# Patient Record
Sex: FEMALE | Race: WHITE | NOT HISPANIC OR LATINO | Employment: FULL TIME | URBAN - METROPOLITAN AREA
[De-identification: names, ages, dates, MRNs, and addresses within clinical notes are randomized per-mention and may not be internally consistent; named-entity substitution may affect disease eponyms.]

---

## 2017-03-08 ENCOUNTER — CONVERSION ENCOUNTER (OUTPATIENT)
Dept: MAMMOGRAPHY | Facility: HOSPITAL | Age: 47
End: 2017-03-08

## 2017-03-10 ENCOUNTER — CONVERSION ENCOUNTER (OUTPATIENT)
Dept: ULTRASOUND IMAGING | Facility: HOSPITAL | Age: 47
End: 2017-03-10

## 2020-04-30 ENCOUNTER — HOSPITAL ENCOUNTER (OUTPATIENT)
Dept: OTHER | Facility: HOSPITAL | Age: 50
Discharge: HOME | End: 2020-04-30

## 2020-05-02 LAB
CONV MUMPS ANTIBODY IGG: 207 AU/ML
HBV SURFACE AB SER QL: NON REACTIVE
MEV IGG SER IA-ACNC: <13.5 AU/ML
RUBV IGG SER-ACNC: 118.9 [IU]/ML
VZV IGG SER IA-ACNC: >4000 INDEX

## 2020-12-30 ENCOUNTER — HOSPITAL ENCOUNTER (OUTPATIENT)
Dept: OTHER | Facility: HOSPITAL | Age: 50
Discharge: HOME | End: 2020-12-30
Attending: INTERNAL MEDICINE

## 2021-01-28 ENCOUNTER — HOSPITAL ENCOUNTER (OUTPATIENT)
Dept: OTHER | Facility: HOSPITAL | Age: 51
Discharge: HOME | End: 2021-01-28
Attending: INTERNAL MEDICINE

## 2022-11-09 ENCOUNTER — TRANSCRIBE ORDERS (OUTPATIENT)
Dept: ADMINISTRATIVE | Facility: HOSPITAL | Age: 52
End: 2022-11-09

## 2022-11-09 DIAGNOSIS — Z12.31 VISIT FOR SCREENING MAMMOGRAM: Primary | ICD-10-CM

## 2025-03-12 ENCOUNTER — TELEPHONE (OUTPATIENT)
Facility: HOSPITAL | Age: 55
End: 2025-03-12
Payer: COMMERCIAL

## 2025-03-12 NOTE — TELEPHONE ENCOUNTER
Spoke with patient regarding a referral we received from Letty Garza for Hep C. Patient has scheduled an appointment on 4/4/25@11:00. Ok to scheduled per Ankita.

## 2025-03-12 NOTE — TELEPHONE ENCOUNTER
Patients provider called concerned that patient has not been contacted yet by Gastro, she said referral was sent over a week ago and it is urgent. Please have patient seen as soon as possible.

## 2025-04-04 ENCOUNTER — SPECIALTY PHARMACY (OUTPATIENT)
Dept: OTHER | Facility: HOSPITAL | Age: 55
End: 2025-04-04
Payer: COMMERCIAL

## 2025-04-04 ENCOUNTER — LAB (OUTPATIENT)
Facility: HOSPITAL | Age: 55
End: 2025-04-04
Payer: COMMERCIAL

## 2025-04-04 ENCOUNTER — OFFICE VISIT (OUTPATIENT)
Dept: GASTROENTEROLOGY | Facility: HOSPITAL | Age: 55
End: 2025-04-04
Payer: COMMERCIAL

## 2025-04-04 VITALS
DIASTOLIC BLOOD PRESSURE: 78 MMHG | WEIGHT: 223.6 LBS | SYSTOLIC BLOOD PRESSURE: 116 MMHG | HEIGHT: 65 IN | BODY MASS INDEX: 37.25 KG/M2 | HEART RATE: 86 BPM

## 2025-04-04 DIAGNOSIS — B18.2 CHRONIC HEPATITIS C WITHOUT HEPATIC COMA: Primary | ICD-10-CM

## 2025-04-04 DIAGNOSIS — R74.8 ELEVATED LIVER ENZYMES: ICD-10-CM

## 2025-04-04 PROBLEM — I10 HYPERTENSION: Status: ACTIVE | Noted: 2025-04-04

## 2025-04-04 PROBLEM — F41.9 ANXIETY: Status: ACTIVE | Noted: 2025-04-04

## 2025-04-04 LAB
ALBUMIN SERPL-MCNC: 4.4 G/DL (ref 3.5–5.2)
ALBUMIN/GLOB SERPL: 1.2 G/DL
ALP SERPL-CCNC: 76 U/L (ref 39–117)
ALT SERPL W P-5'-P-CCNC: 296 U/L (ref 1–33)
ANION GAP SERPL CALCULATED.3IONS-SCNC: 13.6 MMOL/L (ref 5–15)
AST SERPL-CCNC: 184 U/L (ref 1–32)
BASOPHILS # BLD AUTO: 0.06 10*3/MM3 (ref 0–0.2)
BASOPHILS NFR BLD AUTO: 1 % (ref 0–1.5)
BILIRUB SERPL-MCNC: 0.5 MG/DL (ref 0–1.2)
BUN SERPL-MCNC: 20 MG/DL (ref 6–20)
BUN/CREAT SERPL: 17.7 (ref 7–25)
CALCIUM SPEC-SCNC: 10 MG/DL (ref 8.6–10.5)
CHLORIDE SERPL-SCNC: 99 MMOL/L (ref 98–107)
CO2 SERPL-SCNC: 19.4 MMOL/L (ref 22–29)
CREAT SERPL-MCNC: 1.13 MG/DL (ref 0.57–1)
DEPRECATED RDW RBC AUTO: 42.5 FL (ref 37–54)
EGFRCR SERPLBLD CKD-EPI 2021: 58.7 ML/MIN/1.73
EOSINOPHIL # BLD AUTO: 0.1 10*3/MM3 (ref 0–0.4)
EOSINOPHIL NFR BLD AUTO: 1.6 % (ref 0.3–6.2)
ERYTHROCYTE [DISTWIDTH] IN BLOOD BY AUTOMATED COUNT: 12.7 % (ref 12.3–15.4)
GLOBULIN UR ELPH-MCNC: 3.6 GM/DL
GLUCOSE SERPL-MCNC: 98 MG/DL (ref 65–99)
HBV SURFACE AB SER RIA-ACNC: REACTIVE
HCT VFR BLD AUTO: 45.5 % (ref 34–46.6)
HGB BLD-MCNC: 15 G/DL (ref 12–15.9)
HIV 1+2 AB+HIV1 P24 AG SERPL QL IA: NORMAL
HOLD SPECIMEN: NORMAL
IMM GRANULOCYTES # BLD AUTO: 0.01 10*3/MM3 (ref 0–0.05)
IMM GRANULOCYTES NFR BLD AUTO: 0.2 % (ref 0–0.5)
LYMPHOCYTES # BLD AUTO: 1.88 10*3/MM3 (ref 0.7–3.1)
LYMPHOCYTES NFR BLD AUTO: 29.9 % (ref 19.6–45.3)
MCH RBC QN AUTO: 30.4 PG (ref 26.6–33)
MCHC RBC AUTO-ENTMCNC: 33 G/DL (ref 31.5–35.7)
MCV RBC AUTO: 92.1 FL (ref 79–97)
MONOCYTES # BLD AUTO: 0.59 10*3/MM3 (ref 0.1–0.9)
MONOCYTES NFR BLD AUTO: 9.4 % (ref 5–12)
NEUTROPHILS NFR BLD AUTO: 3.65 10*3/MM3 (ref 1.7–7)
NEUTROPHILS NFR BLD AUTO: 57.9 % (ref 42.7–76)
NRBC BLD AUTO-RTO: 0 /100 WBC (ref 0–0.2)
PLATELET # BLD AUTO: 314 10*3/MM3 (ref 140–450)
PMV BLD AUTO: 10.7 FL (ref 6–12)
POTASSIUM SERPL-SCNC: 4.3 MMOL/L (ref 3.5–5.2)
PROT SERPL-MCNC: 8 G/DL (ref 6–8.5)
RBC # BLD AUTO: 4.94 10*6/MM3 (ref 3.77–5.28)
SODIUM SERPL-SCNC: 132 MMOL/L (ref 136–145)
WBC NRBC COR # BLD AUTO: 6.29 10*3/MM3 (ref 3.4–10.8)

## 2025-04-04 PROCEDURE — 36415 COLL VENOUS BLD VENIPUNCTURE: CPT | Performed by: NURSE PRACTITIONER

## 2025-04-04 PROCEDURE — 80053 COMPREHEN METABOLIC PANEL: CPT | Performed by: NURSE PRACTITIONER

## 2025-04-04 PROCEDURE — G0463 HOSPITAL OUTPT CLINIC VISIT: HCPCS | Performed by: NURSE PRACTITIONER

## 2025-04-04 PROCEDURE — 87517 HEPATITIS B DNA QUANT: CPT | Performed by: NURSE PRACTITIONER

## 2025-04-04 PROCEDURE — 87522 HEPATITIS C REVRS TRNSCRPJ: CPT | Performed by: NURSE PRACTITIONER

## 2025-04-04 PROCEDURE — G0432 EIA HIV-1/HIV-2 SCREEN: HCPCS | Performed by: NURSE PRACTITIONER

## 2025-04-04 PROCEDURE — 86706 HEP B SURFACE ANTIBODY: CPT | Performed by: NURSE PRACTITIONER

## 2025-04-04 PROCEDURE — 86704 HEP B CORE ANTIBODY TOTAL: CPT | Performed by: NURSE PRACTITIONER

## 2025-04-04 PROCEDURE — 85025 COMPLETE CBC W/AUTO DIFF WBC: CPT | Performed by: NURSE PRACTITIONER

## 2025-04-04 PROCEDURE — 87902 NFCT AGT GNTYP ALYS HEP C: CPT | Performed by: NURSE PRACTITIONER

## 2025-04-04 RX ORDER — LISINOPRIL AND HYDROCHLOROTHIAZIDE 20; 25 MG/1; MG/1
1 TABLET ORAL DAILY
COMMUNITY
Start: 2025-01-20

## 2025-04-04 NOTE — PROGRESS NOTES
Chief Complaint      Chief Complaint  Hepatitis C    Subjective            History of Present Illness     Valerie Man presents to [facility] for evaluation and treatment of chronic HCV.      She is unable to say when she was diagnosed with HCV, however review of medical record indicates a positive screening in 2014.  Denies previous treatment.  Admits a history of illicit drug use, however she's been clean for six years.  Denies ETOH abuse.      Unable to obtain FibroScan due to patient discomfort.     Past Medical History     No Known Allergies    Current Outpatient Medications:     amLODIPine (Norvasc) 10 MG tablet, Norvasc 10 mg oral tablet take 1 tablet (10 mg) by oral route once daily   Active, Disp: , Rfl:     lisinopril-hydrochlorothiazide (PRINZIDE,ZESTORETIC) 20-25 MG per tablet, Take 1 tablet by mouth Daily., Disp: , Rfl:     multivitamin (THERAGRAN) tablet tablet, Take 1 tablet by mouth Daily., Disp: , Rfl:   Past Medical History:   Diagnosis Date    Hypertension      History reviewed. No pertinent surgical history.  Social History     Socioeconomic History    Marital status: Single   Tobacco Use    Smoking status: Former     Current packs/day: 0.50     Types: Cigarettes    Smokeless tobacco: Never   Vaping Use    Vaping status: Never Used   Substance and Sexual Activity    Alcohol use: Never    Drug use: Never       Objective     Objective     Vitals:    04/04/25 1106   BP: 116/78   Pulse: 86     Body mass index is 37.21 kg/m².  Body surface area is 2.07 meters squared.    Physical Exam    Results       Result Review :     The following data was reviewed by: SHELLY Syed on 04/04/2025:      1/24/2025 acute hepatitis panel: Hepatitis A antibody, IgM-negative, hepatitis B surface antigen-negative, hepatitis B core IgM-negative hepatitis C antibody-reactive.  HCV RNA 10,500,000    1/17/2025 CBC: Hemoglobin 13.2 hematocrit 36.4 platelets 285.  CMP:  Creatinine 0.9, alk phos 84,  total bilirubin 0.6.  Iron profile iron 130  Ferritin 225.            Assessment and Plan            Assessment:    Diagnoses and all orders for this visit:    1. Chronic hepatitis C without hepatic coma (Primary)  -     Hepatitis B Surface Antibody  -     Hepatitis B Core Antibody, Total  -     Hepatitis C Genotype  -     HIV-1 & HIV-2 Antibodies  -     Hepatitis C RNA, Quantitative, PCR (graph)  -     Comprehensive Metabolic Panel  -     CBC Auto Differential  -     HCV FibroSURE  -     US Abdomen Limited; Future    2. Elevated liver enzymes         Plan:   Labs today to determine genotype and treatment plan.      Patient Instructions: Avoid Alcohol, Avoid Illicit Drug Use, Importance of keeping appointments.  Patient Education Provided: Yes    Follow Up     Follow Up   Return in about 4 weeks (around 5/2/2025) for Hepatitis C.  Patient was given instructions and counseling regarding her condition or for health maintenance advice. Please see specific information pulled into the AVS if appropriate.     Charla Renee, APRN  04/04/2025

## 2025-04-05 LAB — HBV CORE AB SERPL QL IA: POSITIVE

## 2025-04-07 ENCOUNTER — RESULTS FOLLOW-UP (OUTPATIENT)
Dept: GASTROENTEROLOGY | Facility: HOSPITAL | Age: 55
End: 2025-04-07
Payer: COMMERCIAL

## 2025-04-07 ENCOUNTER — TELEPHONE (OUTPATIENT)
Dept: GASTROENTEROLOGY | Facility: CLINIC | Age: 55
End: 2025-04-07
Payer: COMMERCIAL

## 2025-04-07 DIAGNOSIS — B18.2 CHRONIC HEPATITIS C WITHOUT HEPATIC COMA: Primary | ICD-10-CM

## 2025-04-07 LAB — SPECIMEN STATUS: NORMAL

## 2025-04-07 NOTE — TELEPHONE ENCOUNTER
Radha at lab states this can be added to already drawn labs.       Left voicemail for pt requesting a returned call.

## 2025-04-07 NOTE — TELEPHONE ENCOUNTER
Caller: HEATHER CANDELARIO    Relationship: SELF    Best call back number: [phone]    What is the best time to reach you:     Who are you requesting to speak with (clinical staff, provider,  specific staff member): CLINICAL    Do you know the name of the person who called: KINSEY    What was the call regarding: LABS    Is it okay if the provider responds through MyChart:   
Addressed in additional encounter   
forehead

## 2025-04-08 LAB
A2 MACROGLOB SERPL-MCNC: 259 MG/DL (ref 110–276)
ALT SERPL W P-5'-P-CCNC: 308 IU/L (ref 0–40)
APO A-I SERPL-MCNC: 139 MG/DL (ref 116–209)
BILIRUB SERPL-MCNC: 0.5 MG/DL (ref 0–1.2)
FIBROSIS SCORING:: ABNORMAL
FIBROSIS STAGE SERPL QL: ABNORMAL
GGT SERPL-CCNC: 46 IU/L (ref 0–60)
HAPTOGLOB SERPL-MCNC: 280 MG/DL (ref 33–346)
HCV AB SER QL: ABNORMAL
HCV GENTYP SERPL NAA+PROBE: 3
LABORATORY COMMENT REPORT: ABNORMAL
LIVER FIBR SCORE SERPL CALC.FIBROSURE: 0.24 (ref 0–0.21)
NECROINFLAMM ACTIVITY SCORING:: ABNORMAL
NECROINFLAMMATORY ACT GRADE SERPL QL: ABNORMAL
NECROINFLAMMATORY ACT SCORE SERPL: 0.89 (ref 0–0.17)
SERVICE CMNT-IMP: ABNORMAL
TEST PERFORMANCE INFO SPEC: ABNORMAL

## 2025-04-09 LAB
HBV DNA SERPL NAA+PROBE-ACNC: NORMAL IU/ML
HBV DNA SERPL NAA+PROBE-LOG IU: NORMAL LOG10 IU/ML
HCV RNA SERPL NAA+PROBE-ACNC: NORMAL IU/ML
HCV RNA SERPL NAA+PROBE-ACNC: NORMAL IU/ML
HCV RNA SERPL NAA+PROBE-LOG IU: 7.19 LOG10 IU/ML
REF LAB TEST REF RANGE: NORMAL
REF LAB TEST REF RANGE: NORMAL

## 2025-04-22 ENCOUNTER — HOSPITAL ENCOUNTER (OUTPATIENT)
Dept: ULTRASOUND IMAGING | Facility: HOSPITAL | Age: 55
Discharge: HOME | End: 2025-04-22
Admitting: NURSE PRACTITIONER
Payer: COMMERCIAL

## 2025-04-22 DIAGNOSIS — B18.2 CHRONIC HEPATITIS C WITHOUT HEPATIC COMA: ICD-10-CM

## 2025-04-22 PROCEDURE — 76705 ECHO EXAM OF ABDOMEN: CPT

## 2025-04-22 NOTE — PROGRESS NOTES
SW was able to provide Good RX card and pharmacy savings card for patient in regards to assistance with medication coverage.

## 2025-04-28 ENCOUNTER — TELEPHONE (OUTPATIENT)
Dept: GASTROENTEROLOGY | Facility: HOSPITAL | Age: 55
End: 2025-04-28
Payer: COMMERCIAL

## 2025-04-28 NOTE — TELEPHONE ENCOUNTER
Epclusa 400-100 mg was approved by the patient's insurance from 04/28/25 thru 07/21/2025. Patient must fill with preferred specialty pharmacy, [facility].     Kristin Archer, [facility]  [phone]

## 2025-04-29 ENCOUNTER — TELEPHONE (OUTPATIENT)
Dept: GASTROENTEROLOGY | Facility: HOSPITAL | Age: 55
End: 2025-04-29
Payer: COMMERCIAL

## 2025-05-02 ENCOUNTER — SPECIALTY PHARMACY (OUTPATIENT)
Dept: OTHER | Facility: HOSPITAL | Age: 55
End: 2025-05-02
Payer: COMMERCIAL

## 2025-05-02 NOTE — PROGRESS NOTES
Specialty Pharmacy Patient Management Program  Hepatitis C Initial Assessment     Valerie Man is a 54 y.o. female referred by their provider to the Hepatitis C Patient Management program offered by [facility] for Hepatitis C on 05/02/25. An initial outreach was conducted, including assessment of therapy appropriateness and specialty medication education for Epclusa (sofosbuvir/velpatasvir). The patient was introduced to services offered by [facility], including: regular assessments, refill coordination, curbside pick-up or mail order delivery options, prior authorization maintenance, and financial assistance programs as applicable. The patient was also provided with contact information for the pharmacy team.     Insurance Coverage & Financial Support  PA obtained, sent to [facility] per plan      Relevant Past Medical History and Comorbidities  Relevant medical history and concomitant health conditions were discussed with the patient. The patient's chart has been reviewed for relevant past medical history and comorbid health conditions and updated as necessary.   Past Medical History:   Diagnosis Date    Hypertension      Social History     Socioeconomic History    Marital status: Single   Tobacco Use    Smoking status: Former     Current packs/day: 0.50     Types: Cigarettes    Smokeless tobacco: Never   Vaping Use    Vaping status: Never Used   Substance and Sexual Activity    Alcohol use: Never    Drug use: Never     Problem list reviewed by Lynne Bui RPH on 5/2/2025 at 11:36 AM    Allergies  Known allergies and reactions were discussed with the patient. The patient's chart has been reviewed for allergy information and updated as necessary.   Patient has no known allergies.  Allergies reviewed by Lynen Bui RPH on 5/2/2025 at 11:36 AM    Current Medication List  This medication list has been reviewed with the patient and evaluated for any interactions or  necessary modifications/recommendations, and updated to include all prescription medications, OTC medications, and supplements the patient is currently taking. This list reflects what is contained in the patient's profile, which has also been marked as reviewed to communicate to other providers it is the most up to date version of the patient's current medication therapy.     Current Outpatient Medications:     amLODIPine (Norvasc) 10 MG tablet, Norvasc 10 mg oral tablet take 1 tablet (10 mg) by oral route once daily   Active, Disp: , Rfl:     lisinopril-hydrochlorothiazide (PRINZIDE,ZESTORETIC) 20-25 MG per tablet, Take 1 tablet by mouth Daily., Disp: , Rfl:     multivitamin (THERAGRAN) tablet tablet, Take 1 tablet by mouth Daily., Disp: , Rfl:     Sofosbuvir-Velpatasvir 400-100 MG tablet, Take 1 tablet by mouth Daily for 84 days. Indications: Hepatitis due to Hepatitis C Virus, Genotype 3, Disp: 28 tablet, Rfl: 2  Medicines reviewed by Lynne Bui RPH on 5/2/2025 at 11:36 AM    Drug Interactions  none     Evaluation  Prior hepatitis C treatment: none   Co-infection status  Hepatitis B:neg  HIV: neg  Fibrosis score:   Score method: n/a  Child-Tolbert classification if cirrhotic: n/a  Urine drug screen: neg    Relevant Laboratory Values  Lab Results   Component Value Date    HCVGENOTYPE 3 04/04/2025    HEPATITISC See Final Results 04/04/2025     Fibrosis Score   Date/Time Value Ref Range Status   04/04/2025 11:36 AM 0.24 (H) 0.00 - 0.21 Final     Fibrosis Scoring:   Date/Time Value Ref Range Status   04/04/2025 11:36 AM Comment  Final     Comment:          <=0.21 = Stage F0 - No fibrosis  0.21 - 0.27 = Stage F0 - F1  0.27 - 0.31 = Stage F1 - Portal fibrosis  0.31 - 0.48 = Stage F1 - F2  0.48 - 0.58 = Stage F2 - Bridging fibrosis with few septa  0.58 - 0.72 = Stage F3 - Bridging fibrosis with many septa  0.72 - 0.74 = Stage F3 - F4        >0.74 = Stage F4 - Cirrhosis     Lab Results   Component Value Date    HEPBCAB  Positive (A) 04/04/2025    HEPBSAB Reactive 04/04/2025    [id number] Non-Reactive 04/04/2025     Lab Results   Component Value Date    GLUCOSE 98 04/04/2025    BUN 20 04/04/2025    CREATININE 1.13 (H) 04/04/2025    BCR 17.7 04/04/2025     (L) 04/04/2025    K 4.3 04/04/2025    CL 99 04/04/2025    CO2 19.4 (L) 04/04/2025    CALCIUM 10.0 04/04/2025    PROTEINTOT 8.0 04/04/2025    ALBUMIN 4.4 04/04/2025     (H) 04/04/2025     (H) 04/04/2025    ALKPHOS 76 04/04/2025    BILITOT 0.5 04/04/2025    ANIONGAP 13.6 04/04/2025     Lab Results   Component Value Date    WBC 6.29 04/04/2025    HGB 15.0 04/04/2025    HCT 45.5 04/04/2025     04/04/2025     Lab Value Review  The above lab values have been reviewed; the following specialty medication(s) dose adjustment(s) are recommended: none.    Initial Education Provided for Specialty Medication  The patient has been provided with the following education and any applicable administration techniques (i.e. self-injection) have been demonstrated for the therapies indicated. All questions and concerns have been addressed prior to the patient receiving the medication, and the patient has verbalized understanding of the education and any materials provided. Additional patient education shall be provided and documented upon request by the patient, provider, or payer.      Epclusa (sofosbuvir/velpatasvir)         Medication Expectations   Why am I taking this medication? This is indicated for patients with hepatitis C virus (HCV) genotypes 1-6 without cirrhosis, genotypes 1, 2, 4-6 with compensated cirrhosis, or genotype 3 with compensated cirrhosis if FERNANDA Y93H is negative. It is also indicated for patients with genotypes 1-6 with decompensated cirrhosis, however this will require longer treatment and/or the addition of ribavirin.   What should I expect while on this medication? There is a > 90% cure rate of hepatitis C with completed treatment.   How does the  medication work? Sofosbuvir is an inhibitor of HCV NS5B protease, necessary for replication of the virus.    Velpatasvir is an inhibitor of HCV NS5A, essential for viral replication and assembly.   How long will I be on this medication for? You will be on this medication for 12 weeks.   How do I take this medication? Sofosbuvir/velpatasvir is 1 tablet taken at the same time each day with or without food.   What are some possible side effects? The most common side effects are headache, fatigue, nausea, insomnia, and common cold symptoms.   What happens if I miss a dose? If it has been less than 18 hours, take the missed dose as soon as you can. Take your next dose at the usual time.  If it has been more than 18 hours, do not take your missed dose, take your next dose as usual.            Medication Safety   What are things I should warn my doctor immediately about? Allergic reaction (itching or hives, swelling in your face or hands, swelling or tingling in your mouth or throat, chest tightness, or trouble breathing); signs of liver failure including dark urine, pale stools, nausea, vomiting, loss of appetite, stomach pain, yellow skin or eyes.   What are things that I should be cautious of? Headache, nausea, tiredness or weakness.   What are some medications that can interact with this one? Some medicines can affect how sofosbuvir/velpatasvir works. Tell your doctor if you are using any of the following:  Rifampin, rifabutin, rifapentin, carbamazepine, oxcarbazepine, phenytoin, phenobarbital, Americo's wort, or amiodarone   Medicine to treat HIV infection (including tipranavir, efavirenz, or ritonavir)  If you are taking a PPI, your therapy may need to be temporarily discontinued. If PPI therapy is necessary, omeprazole 20 mg is preferred and should be taken at least 4 hours after sofosbuvir/velpatasvir.            Medication Storage/Handling   How should I handle this medication? Keep this medication out of reach of  pets/children in original container.     How does this medication need to be stored? Store at room temperature.   How should I dispose of this medication? You should not have any medication left over. If you do reach out to your pharmacist of doctor.            Resources/Support   How can I remind myself to take this medication? You can download a reminder vianey on your phone or use a calendar to help with your once daily reminder.   Is financial support available?  Yes, [facility] can provide co-pay cards if you have commercial insurance or patient assistance if you have Medicare or no insurance.    Which vaccines are recommended for me? Talk with your doctor about the hepatitis B vaccine.              Adherence and Self-Administration  Adherence related to the patient's specialty therapy was discussed with the patient. The Adherence segment of this outreach has been reviewed and updated.  Is there a concern with patient's ability to self administer the medication correctly and without issue?: No  Were any potential barriers to adherence identified during the initial assessment or patient education?: No  Are there any concerns regarding the patient's understanding of the importance of medication adherence?: No  Methods for Supporting Patient Adherence and/or Self-Administration: N/A     Open Medication Therapy Problems  No medication therapy recommendations to display    Goals of Therapy  Goals related to the patient's specialty therapy were discussed with the patient. The Patient Goals segment of this outreach has been reviewed and updated.  Patient Goals of Therapy: take medication daily with no missed doses   Clinical Goals or Therapeutic Targets: sustained virological response (SVR) at 12 weeks post-treatment    Reassessment Plan & Follow-Up  Hepatitis C Therapy Plan: Start Epclusa x12 weeks  Other Medication Therapy Changes: none  Additional Plans, Therapy Recommendations, or Therapy Problems to Be Addressed: none    Pharmacist to perform regular reassessments approximately every (4) weeks from the previous assessment.  Specialty Pharmacy team to set up future refill outreaches and coordinate prescription delivery.   Welcome information and patient satisfaction survey to be sent by specialty pharmacy team with patient's initial fill.    Attestation  Therapeutic appropriateness: Appropriate   I attest the patient was actively involved in and has agreed to the above plan of care. If the prescribed therapy is at any point deemed not appropriate based on the current or future assessments, a consultation will be initiated with the patient's specialty care provider to determine the best course of action. The revised plan of therapy will be documented along with any required assessments and/or additional patient education provided.     Lynne Bui, PharmD  Clinical Specialty Pharmacist, Gastroenterology  05/02/25 11:37 EDT

## 2025-05-28 ENCOUNTER — TELEPHONE (OUTPATIENT)
Dept: GASTROENTEROLOGY | Facility: HOSPITAL | Age: 55
End: 2025-05-28
Payer: COMMERCIAL

## 2025-05-28 NOTE — TELEPHONE ENCOUNTER
Left voicemail confirming appointment for Friday, requested patient to give us a call back if she is unable to keep it.

## 2025-05-29 ENCOUNTER — TELEPHONE (OUTPATIENT)
Dept: GASTROENTEROLOGY | Facility: HOSPITAL | Age: 55
End: 2025-05-29
Payer: COMMERCIAL

## 2025-05-29 NOTE — TELEPHONE ENCOUNTER
Spoke with patient to see if she would like a an earlier appointment on 5/30/25 at the complex care clinic. She will call back after she speaks with her supervisor.

## 2025-05-30 ENCOUNTER — OFFICE VISIT (OUTPATIENT)
Dept: GASTROENTEROLOGY | Facility: HOSPITAL | Age: 55
End: 2025-05-30
Payer: COMMERCIAL

## 2025-05-30 ENCOUNTER — SPECIALTY PHARMACY (OUTPATIENT)
Dept: OTHER | Facility: HOSPITAL | Age: 55
End: 2025-05-30
Payer: COMMERCIAL

## 2025-05-30 ENCOUNTER — LAB (OUTPATIENT)
Facility: HOSPITAL | Age: 55
End: 2025-05-30
Payer: COMMERCIAL

## 2025-05-30 VITALS
HEART RATE: 74 BPM | HEIGHT: 65 IN | WEIGHT: 223 LBS | DIASTOLIC BLOOD PRESSURE: 94 MMHG | SYSTOLIC BLOOD PRESSURE: 132 MMHG | BODY MASS INDEX: 37.15 KG/M2

## 2025-05-30 DIAGNOSIS — B18.2 CHRONIC HEPATITIS C WITHOUT HEPATIC COMA: Primary | ICD-10-CM

## 2025-05-30 DIAGNOSIS — K76.0 FATTY LIVER: ICD-10-CM

## 2025-05-30 LAB
ALBUMIN SERPL-MCNC: 4.2 G/DL (ref 3.5–5.2)
ALBUMIN/GLOB SERPL: 1.2 G/DL
ALP SERPL-CCNC: 76 U/L (ref 39–117)
ALT SERPL W P-5'-P-CCNC: 69 U/L (ref 1–33)
ANION GAP SERPL CALCULATED.3IONS-SCNC: 11.4 MMOL/L (ref 5–15)
AST SERPL-CCNC: 48 U/L (ref 1–32)
BASOPHILS # BLD AUTO: 0.07 10*3/MM3 (ref 0–0.2)
BASOPHILS NFR BLD AUTO: 0.9 % (ref 0–1.5)
BILIRUB SERPL-MCNC: 0.3 MG/DL (ref 0–1.2)
BUN SERPL-MCNC: 28 MG/DL (ref 6–20)
BUN/CREAT SERPL: 23.9 (ref 7–25)
CALCIUM SPEC-SCNC: 10.1 MG/DL (ref 8.6–10.5)
CHLORIDE SERPL-SCNC: 104 MMOL/L (ref 98–107)
CO2 SERPL-SCNC: 25.6 MMOL/L (ref 22–29)
CREAT SERPL-MCNC: 1.17 MG/DL (ref 0.57–1)
DEPRECATED RDW RBC AUTO: 45.9 FL (ref 37–54)
EGFRCR SERPLBLD CKD-EPI 2021: 56.3 ML/MIN/1.73
EOSINOPHIL # BLD AUTO: 0.14 10*3/MM3 (ref 0–0.4)
EOSINOPHIL NFR BLD AUTO: 1.8 % (ref 0.3–6.2)
ERYTHROCYTE [DISTWIDTH] IN BLOOD BY AUTOMATED COUNT: 13.3 % (ref 12.3–15.4)
GLOBULIN UR ELPH-MCNC: 3.4 GM/DL
GLUCOSE SERPL-MCNC: 83 MG/DL (ref 65–99)
HCT VFR BLD AUTO: 38.7 % (ref 34–46.6)
HGB BLD-MCNC: 13.1 G/DL (ref 12–15.9)
IMM GRANULOCYTES # BLD AUTO: 0.02 10*3/MM3 (ref 0–0.05)
IMM GRANULOCYTES NFR BLD AUTO: 0.3 % (ref 0–0.5)
LYMPHOCYTES # BLD AUTO: 2.78 10*3/MM3 (ref 0.7–3.1)
LYMPHOCYTES NFR BLD AUTO: 36.7 % (ref 19.6–45.3)
MCH RBC QN AUTO: 32 PG (ref 26.6–33)
MCHC RBC AUTO-ENTMCNC: 33.9 G/DL (ref 31.5–35.7)
MCV RBC AUTO: 94.6 FL (ref 79–97)
MONOCYTES # BLD AUTO: 0.7 10*3/MM3 (ref 0.1–0.9)
MONOCYTES NFR BLD AUTO: 9.2 % (ref 5–12)
NEUTROPHILS NFR BLD AUTO: 3.86 10*3/MM3 (ref 1.7–7)
NEUTROPHILS NFR BLD AUTO: 51.1 % (ref 42.7–76)
NRBC BLD AUTO-RTO: 0 /100 WBC (ref 0–0.2)
PLATELET # BLD AUTO: 297 10*3/MM3 (ref 140–450)
PMV BLD AUTO: 10.2 FL (ref 6–12)
POTASSIUM SERPL-SCNC: 4.6 MMOL/L (ref 3.5–5.2)
PROT SERPL-MCNC: 7.6 G/DL (ref 6–8.5)
RBC # BLD AUTO: 4.09 10*6/MM3 (ref 3.77–5.28)
SODIUM SERPL-SCNC: 141 MMOL/L (ref 136–145)
WBC NRBC COR # BLD AUTO: 7.57 10*3/MM3 (ref 3.4–10.8)

## 2025-05-30 PROCEDURE — G0463 HOSPITAL OUTPT CLINIC VISIT: HCPCS | Performed by: NURSE PRACTITIONER

## 2025-05-30 PROCEDURE — 87522 HEPATITIS C REVRS TRNSCRPJ: CPT | Performed by: NURSE PRACTITIONER

## 2025-05-30 PROCEDURE — 80053 COMPREHEN METABOLIC PANEL: CPT | Performed by: NURSE PRACTITIONER

## 2025-05-30 PROCEDURE — 36415 COLL VENOUS BLD VENIPUNCTURE: CPT | Performed by: NURSE PRACTITIONER

## 2025-05-30 PROCEDURE — 85025 COMPLETE CBC W/AUTO DIFF WBC: CPT | Performed by: NURSE PRACTITIONER

## 2025-05-30 NOTE — PROGRESS NOTES
Chief Complaint        Hepatitis C    HPI     Ms. Man is a 53 y/o female with chronic HCV, genotype 3 with F0-F1 fibrosis determined by fibrosure.  She began treatment with epclusa about 3 weeks ago and reports that she's doing well.  Denies any current side effects.  Reports compliance with treatment regimen.      Subjective            Past Medical History     No Known Allergies    Current Outpatient Medications:     amLODIPine (Norvasc) 10 MG tablet, Norvasc 10 mg oral tablet take 1 tablet (10 mg) by oral route once daily   Active, Disp: , Rfl:     lisinopril-hydrochlorothiazide (PRINZIDE,ZESTORETIC) 20-25 MG per tablet, Take 1 tablet by mouth Daily., Disp: , Rfl:     multivitamin (THERAGRAN) tablet tablet, Take 1 tablet by mouth Daily., Disp: , Rfl:     Sofosbuvir-Velpatasvir 400-100 MG tablet, Take 1 tablet by mouth Daily for 84 days. Indications: Hepatitis due to Hepatitis C Virus, Genotype 3, Disp: 28 tablet, Rfl: 2  Past Medical History:   Diagnosis Date    Hypertension      History reviewed. No pertinent surgical history.  Social History     Socioeconomic History    Marital status: Single   Tobacco Use    Smoking status: Former     Current packs/day: 0.50     Types: Cigarettes    Smokeless tobacco: Never   Vaping Use    Vaping status: Never Used   Substance and Sexual Activity    Alcohol use: Never    Drug use: Never    Sexual activity: Defer       Objective     Objective     Vital Signs     Vitals:    05/30/25 1313   BP: 132/94   Pulse: 74     Body mass index is 37.11 kg/m².  Body surface area is 2.07 meters squared.      Physical Exam    Results       Result Review :     The following data was reviewed by: SHELLY Syed on 05/30/2025:      CMP:  Lab Results   Component Value Date    BUN 20 04/04/2025    CREATININE 1.13 (H) 04/04/2025     (L) 04/04/2025    K 4.3 04/04/2025    CL 99 04/04/2025    CALCIUM 10.0 04/04/2025    ALBUMIN 4.4 04/04/2025    BILITOT 0.5 04/04/2025    ALKPHOS 76  04/04/2025     (H) 04/04/2025     (H) 04/04/2025     CBC:   Lab Results   Component Value Date    WBC 6.29 04/04/2025    RBC 4.94 04/04/2025    HGB 15.0 04/04/2025    HCT 45.5 04/04/2025    MCV 92.1 04/04/2025    MCH 30.4 04/04/2025    MCHC 33.0 04/04/2025    RDW 12.7 04/04/2025     04/04/2025    NEUTRORELPCT 57.9 04/04/2025    AUTOIGPER 0.2 04/04/2025    LYMPHORELPCT 29.9 04/04/2025    MONORELPCT 9.4 04/04/2025    EOSRELPCT 1.6 04/04/2025    BASORELPCT 1.0 04/04/2025     HCV Genotype, HIV   Lab Results   Component Value Date    HCVGENOTYPE 3 04/04/2025    [id number] Non-Reactive 04/04/2025      HCV RNA   Lab Results   Component Value Date    HCVRNAIU [id number] 04/04/2025    HEPATITISC See Final Results 04/04/2025 4/22/2025 right upper quadrant ultrasound-liver is normal in size.  Hepatic steatosis is noted.  No intrahepatic biliary ductal dilation.        Assessment and Plan          Assessment & Plan:    Diagnoses and all orders for this visit:    1. Chronic hepatitis C without hepatic coma (Primary)  -     Comprehensive Metabolic Panel  -     Hepatitis C RNA, Quantitative, PCR (graph)  -     CBC & Differential    2. Fatty liver        Labs today to ensure SVR.  Patient Instructions: Avoid Alcohol, Avoid Illicit Drug Use, Importance of keeping appointments.  Patient Education Provided: Yes      Follow Up     Follow Up   Return in about 8 weeks (around 7/25/2025) for Hepatitis C.  Patient was given instructions and counseling regarding her condition or for health maintenance advice. Please see specific information pulled into the AVS if appropriate.     Charla Renee, APRN  05/30/2025

## 2025-05-30 NOTE — PROGRESS NOTES
Spoke with [facility], who stated they have attempted contact with the patient regarding Epclusa refill but have not heard back. I left a voicemail for the patient with [facility] contact information. She will need to reach out to them for shipment of next refill.     Lynne Bui, PharmD  Specialty Pharmacist  Pulmonology/Gastroenterology    [phone]

## 2025-06-02 LAB
HCV RNA SERPL NAA+PROBE-ACNC: 30 IU/ML
HCV RNA SERPL NAA+PROBE-LOG IU: 1.48 LOG10 IU/ML
REF LAB TEST REF RANGE: NORMAL

## 2025-07-25 ENCOUNTER — OFFICE VISIT (OUTPATIENT)
Dept: GASTROENTEROLOGY | Facility: HOSPITAL | Age: 55
End: 2025-07-25
Payer: COMMERCIAL

## 2025-07-25 ENCOUNTER — LAB (OUTPATIENT)
Facility: HOSPITAL | Age: 55
End: 2025-07-25
Payer: COMMERCIAL

## 2025-07-25 VITALS
WEIGHT: 222.6 LBS | HEIGHT: 65 IN | HEART RATE: 67 BPM | DIASTOLIC BLOOD PRESSURE: 89 MMHG | BODY MASS INDEX: 37.09 KG/M2 | SYSTOLIC BLOOD PRESSURE: 124 MMHG

## 2025-07-25 DIAGNOSIS — B18.2 CHRONIC HEPATITIS C WITHOUT HEPATIC COMA: Primary | ICD-10-CM

## 2025-07-25 PROBLEM — B19.20 HEPATITIS C VIRUS INFECTION: Status: RESOLVED | Noted: 2025-07-25 | Resolved: 2025-07-25

## 2025-07-25 PROBLEM — F41.1 GAD (GENERALIZED ANXIETY DISORDER): Status: RESOLVED | Noted: 2025-07-25 | Resolved: 2025-07-25

## 2025-07-25 PROBLEM — B19.20 HEPATITIS C VIRUS INFECTION: Status: ACTIVE | Noted: 2025-07-25

## 2025-07-25 PROBLEM — I10 ESSENTIAL HYPERTENSION: Status: ACTIVE | Noted: 2025-07-25

## 2025-07-25 PROBLEM — E66.9 OBESITY (BMI 30-39.9): Status: ACTIVE | Noted: 2025-07-25

## 2025-07-25 PROBLEM — E55.9 VITAMIN D DEFICIENCY: Status: ACTIVE | Noted: 2025-07-25

## 2025-07-25 PROBLEM — F41.1 GAD (GENERALIZED ANXIETY DISORDER): Status: ACTIVE | Noted: 2025-07-25

## 2025-07-25 PROBLEM — I10 HYPERTENSION: Status: RESOLVED | Noted: 2025-04-04 | Resolved: 2025-07-25

## 2025-07-25 LAB
ALBUMIN SERPL-MCNC: 4.3 G/DL (ref 3.5–5.2)
ALBUMIN/GLOB SERPL: 1.2 G/DL
ALP SERPL-CCNC: 93 U/L (ref 39–117)
ALT SERPL W P-5'-P-CCNC: 33 U/L (ref 1–33)
ANION GAP SERPL CALCULATED.3IONS-SCNC: 11 MMOL/L (ref 5–15)
AST SERPL-CCNC: 36 U/L (ref 1–32)
BASOPHILS # BLD AUTO: 0.05 10*3/MM3 (ref 0–0.2)
BASOPHILS NFR BLD AUTO: 0.7 % (ref 0–1.5)
BILIRUB SERPL-MCNC: 0.2 MG/DL (ref 0–1.2)
BUN SERPL-MCNC: 17 MG/DL (ref 6–20)
BUN/CREAT SERPL: 13.2 (ref 7–25)
CALCIUM SPEC-SCNC: 10.3 MG/DL (ref 8.6–10.5)
CHLORIDE SERPL-SCNC: 101 MMOL/L (ref 98–107)
CO2 SERPL-SCNC: 25 MMOL/L (ref 22–29)
CREAT SERPL-MCNC: 1.29 MG/DL (ref 0.57–1)
DEPRECATED RDW RBC AUTO: 43.9 FL (ref 37–54)
EGFRCR SERPLBLD CKD-EPI 2021: 50 ML/MIN/1.73
EOSINOPHIL # BLD AUTO: 0.12 10*3/MM3 (ref 0–0.4)
EOSINOPHIL NFR BLD AUTO: 1.6 % (ref 0.3–6.2)
ERYTHROCYTE [DISTWIDTH] IN BLOOD BY AUTOMATED COUNT: 12.8 % (ref 12.3–15.4)
GLOBULIN UR ELPH-MCNC: 3.7 GM/DL
GLUCOSE SERPL-MCNC: 93 MG/DL (ref 65–99)
HCT VFR BLD AUTO: 41.7 % (ref 34–46.6)
HGB BLD-MCNC: 14 G/DL (ref 12–15.9)
IMM GRANULOCYTES # BLD AUTO: 0.02 10*3/MM3 (ref 0–0.05)
IMM GRANULOCYTES NFR BLD AUTO: 0.3 % (ref 0–0.5)
LYMPHOCYTES # BLD AUTO: 2.86 10*3/MM3 (ref 0.7–3.1)
LYMPHOCYTES NFR BLD AUTO: 37.5 % (ref 19.6–45.3)
MCH RBC QN AUTO: 31.9 PG (ref 26.6–33)
MCHC RBC AUTO-ENTMCNC: 33.6 G/DL (ref 31.5–35.7)
MCV RBC AUTO: 95 FL (ref 79–97)
MONOCYTES # BLD AUTO: 0.51 10*3/MM3 (ref 0.1–0.9)
MONOCYTES NFR BLD AUTO: 6.7 % (ref 5–12)
NEUTROPHILS NFR BLD AUTO: 4.06 10*3/MM3 (ref 1.7–7)
NEUTROPHILS NFR BLD AUTO: 53.2 % (ref 42.7–76)
NRBC BLD AUTO-RTO: 0 /100 WBC (ref 0–0.2)
PLATELET # BLD AUTO: 320 10*3/MM3 (ref 140–450)
PMV BLD AUTO: 10.4 FL (ref 6–12)
POTASSIUM SERPL-SCNC: 4.4 MMOL/L (ref 3.5–5.2)
PROT SERPL-MCNC: 8 G/DL (ref 6–8.5)
RBC # BLD AUTO: 4.39 10*6/MM3 (ref 3.77–5.28)
SODIUM SERPL-SCNC: 137 MMOL/L (ref 136–145)
WBC NRBC COR # BLD AUTO: 7.62 10*3/MM3 (ref 3.4–10.8)

## 2025-07-25 PROCEDURE — 85025 COMPLETE CBC W/AUTO DIFF WBC: CPT | Performed by: NURSE PRACTITIONER

## 2025-07-25 PROCEDURE — G0463 HOSPITAL OUTPT CLINIC VISIT: HCPCS | Performed by: NURSE PRACTITIONER

## 2025-07-25 PROCEDURE — 36415 COLL VENOUS BLD VENIPUNCTURE: CPT | Performed by: NURSE PRACTITIONER

## 2025-07-25 PROCEDURE — 80053 COMPREHEN METABOLIC PANEL: CPT | Performed by: NURSE PRACTITIONER

## 2025-07-25 PROCEDURE — 87522 HEPATITIS C REVRS TRNSCRPJ: CPT | Performed by: NURSE PRACTITIONER

## 2025-07-25 RX ORDER — NALTREXONE HYDROCHLORIDE 50 MG/1
TABLET, FILM COATED ORAL EVERY 24 HOURS
COMMUNITY
Start: 2025-07-11

## 2025-07-25 RX ORDER — VELPATASVIR AND SOFOSBUVIR 100; 400 MG/1; MG/1
TABLET, FILM COATED ORAL EVERY 24 HOURS
COMMUNITY

## 2025-07-25 NOTE — PROGRESS NOTES
Chief Complaint        Hepatitis C    HPI   Ms. Man is a 55 y/o female with chronic HCV, genotype 3 with F0-F1 fibrosis determined by fibrosure.     The patient presents for evaluation of hepatitis C.    She has a few doses remaining of her 12-week course of Epclusa. She reports compliance with the treatment regimen and does not experience any current side effects.       Subjective            Past Medical History     No Known Allergies    Current Outpatient Medications:     amLODIPine (Norvasc) 10 MG tablet, Norvasc 10 mg oral tablet take 1 tablet (10 mg) by oral route once daily   Active, Disp: , Rfl:     lisinopril-hydrochlorothiazide (PRINZIDE,ZESTORETIC) 20-25 MG per tablet, Take 1 tablet by mouth Daily., Disp: , Rfl:     multivitamin (THERAGRAN) tablet tablet, Take 1 tablet by mouth Daily., Disp: , Rfl:     naltrexone (DEPADE) 50 MG tablet, Daily., Disp: , Rfl:     Sofosbuvir-Velpatasvir (Epclusa) 400-100 MG tablet, Daily., Disp: , Rfl:   Past Medical History:   Diagnosis Date    Hypertension      History reviewed. No pertinent surgical history.  Social History     Socioeconomic History    Marital status: Single   Tobacco Use    Smoking status: Former     Current packs/day: 0.50     Types: Cigarettes    Smokeless tobacco: Never   Vaping Use    Vaping status: Never Used   Substance and Sexual Activity    Alcohol use: Never    Drug use: Never    Sexual activity: Defer       Objective     Objective   Vital Signs     Vitals:    07/25/25 1015   BP: 124/89   Pulse: 67     Body mass index is 37.04 kg/m².  Body surface area is 2.07 meters squared.      Physical Exam      Results       Result Review :     The following data was reviewed by: SHELLY Syed on 07/25/2025:      CMP:  Lab Results   Component Value Date    BUN 28.0 (H) 05/30/2025    CREATININE 1.17 (H) 05/30/2025     05/30/2025    K 4.6 05/30/2025     05/30/2025    CALCIUM 10.1 05/30/2025    ALBUMIN 4.2 05/30/2025    BILITOT 0.3  05/30/2025    ALKPHOS 76 05/30/2025    AST 48 (H) 05/30/2025    ALT 69 (H) 05/30/2025     CBC:   Lab Results   Component Value Date    WBC 7.57 05/30/2025    RBC 4.09 05/30/2025    HGB 13.1 05/30/2025    HCT 38.7 05/30/2025    MCV 94.6 05/30/2025    MCH 32.0 05/30/2025    MCHC 33.9 05/30/2025    RDW 13.3 05/30/2025     05/30/2025    NEUTRORELPCT 51.1 05/30/2025    AUTOIGPER 0.3 05/30/2025    LYMPHORELPCT 36.7 05/30/2025    MONORELPCT 9.2 05/30/2025    EOSRELPCT 1.8 05/30/2025    BASORELPCT 0.9 05/30/2025     HCV RNA   Lab Results   Component Value Date    HCVRNAIU [id number] 04/04/2025    HEPATITISC 30 05/30/2025                   Assessment and Plan          Assessment & Plan:    Diagnoses and all orders for this visit:    1. Chronic hepatitis C without hepatic coma (Primary)  -     CBC & Differential  -     Hepatitis C RNA, Quantitative, PCR (graph)  -     Comprehensive Metabolic Panel           1. Hepatitis C:  - Continue the remaining doses of the 12-week course of Epclusa.  - Ensure compliance with the treatment regimen.  - Monitor for any side effects.  - Complete labs today to ensure sustained virologic response (SVR).    Follow-up: Follow up in 3 months.    Labs today to ensure SVR.  Patient Instructions: Avoid Alcohol, Avoid Illicit Drug Use, Importance of keeping appointments.  Patient Education Provided: Yes      Follow Up     Follow Up   Return in about 3 months (around 10/25/2025) for Hepatitis C.  Patient was given instructions and counseling regarding her condition or for health maintenance advice. Please see specific information pulled into the AVS if appropriate.     Patient or patient representative verbalized consent for the use of Ambient Listening during the visit with  SHELLY Syed for chart documentation. 7/25/2025  10:25 EDT    SHELLY Syed  07/25/2025

## 2025-07-28 LAB
HCV RNA SERPL NAA+PROBE-ACNC: NORMAL IU/ML
REF LAB TEST REF RANGE: NORMAL